# Patient Record
Sex: FEMALE | Race: WHITE | NOT HISPANIC OR LATINO | Employment: FULL TIME | URBAN - METROPOLITAN AREA
[De-identification: names, ages, dates, MRNs, and addresses within clinical notes are randomized per-mention and may not be internally consistent; named-entity substitution may affect disease eponyms.]

---

## 2019-07-22 ENCOUNTER — OFFICE VISIT (OUTPATIENT)
Dept: OBSTETRICS AND GYNECOLOGY | Facility: CLINIC | Age: 64
End: 2019-07-22

## 2019-07-22 VITALS
DIASTOLIC BLOOD PRESSURE: 84 MMHG | SYSTOLIC BLOOD PRESSURE: 142 MMHG | BODY MASS INDEX: 31.08 KG/M2 | WEIGHT: 198 LBS | HEIGHT: 67 IN

## 2019-07-22 DIAGNOSIS — Z85.3 PERSONAL HISTORY OF BREAST CANCER: ICD-10-CM

## 2019-07-22 DIAGNOSIS — Z01.419 ENCOUNTER FOR GYNECOLOGICAL EXAMINATION WITHOUT ABNORMAL FINDING: Primary | ICD-10-CM

## 2019-07-22 DIAGNOSIS — N95.1 CLIMACTERIC: ICD-10-CM

## 2019-07-22 LAB
DEVELOPER EXPIRATION DATE: NORMAL
DEVELOPER LOT NUMBER: NORMAL
EXPIRATION DATE: NORMAL
FECAL OCCULT BLOOD SCREEN, POC: NEGATIVE
Lab: NORMAL
NEGATIVE CONTROL: NEGATIVE
POSITIVE CONTROL: POSITIVE

## 2019-07-22 PROCEDURE — 99386 PREV VISIT NEW AGE 40-64: CPT | Performed by: OBSTETRICS & GYNECOLOGY

## 2019-07-22 PROCEDURE — 82274 ASSAY TEST FOR BLOOD FECAL: CPT | Performed by: OBSTETRICS & GYNECOLOGY

## 2019-07-22 RX ORDER — LOSARTAN POTASSIUM 25 MG/1
25 TABLET ORAL DAILY
COMMUNITY

## 2019-07-22 RX ORDER — LEVOTHYROXINE SODIUM 0.07 MG/1
75 TABLET ORAL DAILY
COMMUNITY

## 2019-07-22 NOTE — PROGRESS NOTES
Subjective    Chief Complaint   Patient presents with   • Gynecologic Exam     AE      History of Present Illness    Seema Wade is a 63 y.o. female who presents for annual exam.  Non-smoker.  Previous hysterectomy without BSO.  Patient had a double mastectomy in 2012 and has been released by her oncologist but is still worried about her ovaries and follow-up.  Has had a previous hysterectomy without BSO.  She is a non-smoker.  She had a colonoscopy 2 years ago and has another one planned for next year.  She has not had a DEXA scan so is having it scheduled.  She does not need mammograms.    Obstetric History:  OB History     No data available         Menstrual History:     No LMP recorded. Patient has had a hysterectomy.       Past Medical History:   Diagnosis Date   • Breast cancer (CMS/HCC)    • Disease of thyroid gland    • Hypertension      History reviewed. No pertinent family history.  Social History     Tobacco Use   Smoking Status Never Smoker     Counseling given: Not Answered      The following portions of the patient's history were reviewed and updated as appropriate: allergies, current medications, past family history, past medical history, past social history, past surgical history and problem list.    Review of Systems   Constitutional: Negative.  Negative for fever and unexpected weight change.   HENT: Negative.    Respiratory: Negative for shortness of breath and wheezing.    Cardiovascular: Negative for chest pain, palpitations and leg swelling.   Gastrointestinal: Negative for abdominal pain, anal bleeding and blood in stool.   Genitourinary: Negative for dysuria, pelvic pain, urgency, vaginal bleeding, vaginal discharge and vaginal pain.   Skin: Negative.    Neurological: Negative.    Hematological: Negative.  Negative for adenopathy.   Psychiatric/Behavioral: Negative.  Negative for dysphoric mood. The patient is not nervous/anxious.             Objective   Physical Exam   Constitutional:  "She is oriented to person, place, and time. She appears well-developed and well-nourished.   HENT:   Head: Normocephalic.   Eyes: Pupils are equal, round, and reactive to light.   Neck: No tracheal deviation present. No thyromegaly present.   Cardiovascular: Normal rate, regular rhythm and normal heart sounds.   No murmur heard.  Pulmonary/Chest: Effort normal and breath sounds normal. No respiratory distress.   Breasts without masses, tenderness or nipple discharge   Abdominal: Soft. She exhibits no mass. There is no hepatosplenomegaly. There is no tenderness. No hernia.   Genitourinary: Rectum normal and vagina normal. Rectal exam shows guaiac negative stool. No vaginal discharge found.   Genitourinary Comments: External genitalia normal   Lymphadenopathy:     She has no cervical adenopathy.     She has no axillary adenopathy.        Right: No inguinal adenopathy present.        Left: No inguinal adenopathy present.   Neurological: She is alert and oriented to person, place, and time.   Skin: Skin is warm and dry. No rash noted.   Psychiatric: She has a normal mood and affect. Her behavior is normal.   Vitals reviewed.      /84   Ht 170.2 cm (67\")   Wt 89.8 kg (198 lb)   BMI 31.01 kg/m²     Assessment/Plan   Seema was seen today for gynecologic exam.    Diagnoses and all orders for this visit:    Encounter for gynecological examination without abnormal finding  -     IGP,rfx Aptima HPV All Pth  -     POC Occult Blood Stool    Personal history of breast cancer    Climacteric        Mammogram.  Patient will return to office in 4 weeks for a DEXA scan and pelvic ultrasound to evaluate her ovaries and follow-up with me.  I will obtain her oncology records in the meantime to see if any further follow-up is needed by them.             "

## 2019-07-25 LAB
CONV .: NORMAL
CYTOLOGIST CVX/VAG CYTO: NORMAL
CYTOLOGY CVX/VAG DOC CYTO: NORMAL
CYTOLOGY CVX/VAG DOC THIN PREP: NORMAL
DX ICD CODE: NORMAL
HIV 1 & 2 AB SER-IMP: NORMAL
Lab: NORMAL
OTHER STN SPEC: NORMAL
STAT OF ADQ CVX/VAG CYTO-IMP: NORMAL

## 2019-08-29 ENCOUNTER — OFFICE VISIT (OUTPATIENT)
Dept: OBSTETRICS AND GYNECOLOGY | Facility: CLINIC | Age: 64
End: 2019-08-29

## 2019-08-29 ENCOUNTER — PROCEDURE VISIT (OUTPATIENT)
Dept: OBSTETRICS AND GYNECOLOGY | Facility: CLINIC | Age: 64
End: 2019-08-29

## 2019-08-29 VITALS
SYSTOLIC BLOOD PRESSURE: 140 MMHG | WEIGHT: 198 LBS | DIASTOLIC BLOOD PRESSURE: 88 MMHG | BODY MASS INDEX: 31.08 KG/M2 | HEIGHT: 67 IN

## 2019-08-29 DIAGNOSIS — Z85.3 PERSONAL HISTORY OF BREAST CANCER: Primary | ICD-10-CM

## 2019-08-29 DIAGNOSIS — Z00.00 PREVENTATIVE HEALTH CARE: Primary | ICD-10-CM

## 2019-08-29 DIAGNOSIS — M85.80 OSTEOPENIA, UNSPECIFIED LOCATION: ICD-10-CM

## 2019-08-29 DIAGNOSIS — Z78.0 MENOPAUSE: ICD-10-CM

## 2019-08-29 PROCEDURE — 77067 SCR MAMMO BI INCL CAD: CPT | Performed by: OBSTETRICS & GYNECOLOGY

## 2019-08-29 PROCEDURE — 99213 OFFICE O/P EST LOW 20 MIN: CPT | Performed by: OBSTETRICS & GYNECOLOGY

## 2019-08-29 PROCEDURE — 76830 TRANSVAGINAL US NON-OB: CPT | Performed by: OBSTETRICS & GYNECOLOGY

## 2019-08-29 NOTE — PROGRESS NOTES
"Subjective    Chief Complaint   Patient presents with   • Follow-up     f/u us      History of Present Illness    Seema Wade is a 63 y.o. female who presents for consultation concerning her ultrasound today of her ovaries and her DEXA scan.  Patient had a personal history of breast cancer with bilateral mastectomies in 2012 following chemotherapy and has been  fine since.    Obstetric History:  OB History     No data available         Menstrual History:     No LMP recorded. Patient has had a hysterectomy.       Past Medical History:   Diagnosis Date   • Breast cancer (CMS/HCC)    • Disease of thyroid gland    • Hypertension      History reviewed. No pertinent family history.    The following portions of the patient's history were reviewed and updated as appropriate: allergies, current medications, past medical history, past surgical history and problem list.    Review of Systems         Objective   Physical Exam  Negative  /88   Ht 170.2 cm (67\")   Wt 89.8 kg (198 lb)   BMI 31.01 kg/m²     Assessment/Plan   Seema was seen today for follow-up.    Diagnoses and all orders for this visit:    Personal history of breast cancer    Osteopenia, unspecified location        Impression and plan.  20-minute visit today of which always face-to-face counseling .  We discussed her DEXA scan in detail which showed osteopenia of her hip and spine for which she is going to start vitamin D3 1000 units a day due to history of high calcium in the past.  We also reviewed all her previous [place] records concerning follow-up of her breast cancer which showed that no further evaluation was necessary other than yearly exams with her PCP.  We discussed her ultrasound today which showed her ovaries to be totally normal.  I discussed self exam of her axilla which she will perform, and she will discuss with her PCP whether she can in fact take calcium with her vitamin D3.  She will come in next year for an annual exam unless she " is having issues before.